# Patient Record
Sex: FEMALE | Race: BLACK OR AFRICAN AMERICAN | URBAN - METROPOLITAN AREA
[De-identification: names, ages, dates, MRNs, and addresses within clinical notes are randomized per-mention and may not be internally consistent; named-entity substitution may affect disease eponyms.]

---

## 2017-03-22 ENCOUNTER — OFFICE VISIT (OUTPATIENT)
Dept: FAMILY MEDICINE CLINIC | Age: 27
End: 2017-03-22

## 2017-03-22 VITALS
OXYGEN SATURATION: 100 % | BODY MASS INDEX: 22.7 KG/M2 | SYSTOLIC BLOOD PRESSURE: 118 MMHG | RESPIRATION RATE: 18 BRPM | HEART RATE: 61 BPM | HEIGHT: 67 IN | DIASTOLIC BLOOD PRESSURE: 70 MMHG | WEIGHT: 144.6 LBS | TEMPERATURE: 98.2 F

## 2017-03-22 DIAGNOSIS — Z00.00 PHYSICAL EXAM: Primary | ICD-10-CM

## 2017-03-22 NOTE — PROGRESS NOTES
Annamarie Menendez is a 23 y.o. female   Chief Complaint   Patient presents with    Complete Physical    pt states she stopped ocp was getting emotional on it and is doing much better. Pt is also going to be getting  in the summer and states she is tracking her periods instead. Pt also for CPE. Chief Complaint   Patient presents with    Complete Physical     she is a 23y.o. year old female who presents for evalution. Reviewed PmHx, RxHx, FmHx, SocHx, AllgHx and updated and dated in the chart.     Review of Systems - negative except as listed above in the HPI    Objective:     Vitals:    03/22/17 1036   BP: 118/70   Pulse: 61   Resp: 18   Temp: 98.2 °F (36.8 °C)   TempSrc: Oral   SpO2: 100%   Weight: 144 lb 9.6 oz (65.6 kg)   Height: 5' 7\" (1.702 m)     Physical Examination: General appearance - alert, well appearing, and in no distress  Mental status - alert, oriented to person, place, and time  Eyes - pupils equal and reactive, extraocular eye movements intact  Ears - bilateral TM's and external ear canals normal  Nose - normal and patent, no erythema, discharge or polyps  Mouth - mucous membranes moist, pharynx normal without lesions  Neck - supple, no significant adenopathy  Lymphatics - no palpable lymphadenopathy, no hepatosplenomegaly  Chest - clear to auscultation, no wheezes, rales or rhonchi, symmetric air entry  Heart - normal rate, regular rhythm, normal S1, S2, no murmurs, rubs, clicks or gallops  Abdomen - soft, nontender, nondistended, no masses or organomegaly  Back exam - full range of motion, no tenderness, palpable spasm or pain on motion  Neurological - alert, oriented, normal speech, no focal findings or movement disorder noted  Musculoskeletal - no joint tenderness, deformity or swelling  Extremities - peripheral pulses normal, no pedal edema, no clubbing or cyanosis  Skin - normal coloration and turgor, no rashes, no suspicious skin lesions noted    Assessment/ Plan: Lorenzo Meeks was seen today for complete physical.    Diagnoses and all orders for this visit:    Physical exam  -     CBC WITH AUTOMATED DIFF  -     METABOLIC PANEL, COMPREHENSIVE  -     TSH 3RD GENERATION  -     LIPID PANEL       -Patient is in good health  -Discussed with patient cancer risk factors and screens needed  -Patient needs a colonoscopy no  -Labs from previous visits were discussed with patient yes  -Discussed with patient diet and exercise=yes  -Discussed with patient testicular (male)/breast self exam (female)= yes  Follow-up Disposition:  Return in about 1 year (around 3/22/2018), or if symptoms worsen or fail to improve. I have discussed the diagnosis with the patient and the intended plan as seen in the above orders. The patient has received an after-visit summary and questions were answered concerning future plans. Pt conveyed understanding. Medication Side Effects and Warnings were discussed with patient: n/a  Patient Labs were reviewed and or requested: yes  Patient Past Records were reviewed and or requested  n/a    Patient Instructions   A Healthy Lifestyle: Care Instructions  Your Care Instructions  A healthy lifestyle can help you feel good, stay at a healthy weight, and have plenty of energy for both work and play. A healthy lifestyle is something you can share with your whole family. A healthy lifestyle also can lower your risk for serious health problems, such as high blood pressure, heart disease, and diabetes. You can follow a few steps listed below to improve your health and the health of your family. Follow-up care is a key part of your treatment and safety. Be sure to make and go to all appointments, and call your doctor if you are having problems. Its also a good idea to know your test results and keep a list of the medicines you take. How can you care for yourself at home? · Do not eat too much sugar, fat, or fast foods.  You can still have dessert and treats now and then. The goal is moderation. · Start small to improve your eating habits. Pay attention to portion sizes, drink less juice and soda pop, and eat more fruits and vegetables. ¨ Eat a healthy amount of food. A 3-ounce serving of meat, for example, is about the size of a deck of cards. Fill the rest of your plate with vegetables and whole grains. ¨ Limit the amount of soda and sports drinks you have every day. Drink more water when you are thirsty. ¨ Eat at least 5 servings of fruits and vegetables every day. It may seem like a lot, but it is not hard to reach this goal. A serving or helping is 1 piece of fruit, 1 cup of vegetables, or 2 cups of leafy, raw vegetables. Have an apple or some carrot sticks as an afternoon snack instead of a candy bar. Try to have fruits and/or vegetables at every meal.  · Make exercise part of your daily routine. You may want to start with simple activities, such as walking, bicycling, or slow swimming. Try to be active 30 to 60 minutes every day. You do not need to do all 30 to 60 minutes all at once. For example, you can exercise 3 times a day for 10 or 20 minutes. Moderate exercise is safe for most people, but it is always a good idea to talk to your doctor before starting an exercise program.  · Keep moving. [place] the lawn, work in the garden, or [place]. Take the stairs instead of the elevator at work. · If you smoke, quit. People who smoke have an increased risk for heart attack, stroke, cancer, and other lung illnesses. Quitting is hard, but there are ways to boost your chance of quitting tobacco for good. ¨ Use nicotine gum, patches, or lozenges. ¨ Ask your doctor about stop-smoking programs and medicines. ¨ Keep trying.   In addition to reducing your risk of diseases in the future, you will notice some benefits soon after you stop using tobacco. If you have shortness of breath or asthma symptoms, they will likely get better within a few weeks after you quit.  · Limit how much alcohol you drink. Moderate amounts of alcohol (up to 2 drinks a day for men, 1 drink a day for women) are okay. But drinking too much can lead to liver problems, high blood pressure, and other health problems. Family health  If you have a family, there are many things you can do together to improve your health. · Eat meals together as a family as often as possible. · Eat healthy foods. This includes fruits, vegetables, lean meats and dairy, and whole grains. · Include your family in your fitness plan. Most people think of activities such as jogging or tennis as the way to fitness, but there are many ways you and your family can be more active. Anything that makes you breathe hard and gets your heart pumping is exercise. Here are some tips:  ¨ Walk to do errands or to take your child to school or the bus. ¨ Go for a family bike ride after dinner instead of watching TV. Where can you learn more? Go to http://christin-satish.info/. Enter F923 in the search box to learn more about \"A Healthy Lifestyle: Care Instructions. \"  Current as of: July 26, 2016  Content Version: 11.1  © 8286-5662 [employer]. Care instructions adapted under license by [employer] (which disclaims liability or warranty for this information). If you have questions about a medical condition or this instruction, always ask your healthcare professional. [place] [address] any warranty or liability for your use of this information.             Dr. Audrey Christina

## 2017-03-22 NOTE — MR AVS SNAPSHOT
Visit Information Date & Time Provider Department Dept. Phone Encounter #  
 3/22/2017 10:30 AM Nan Hillman, [facility] [phone] [id number] Follow-up Instructions Return in about 1 year (around 3/22/2018), or if symptoms worsen or fail to improve. Upcoming Health Maintenance Date Due Pneumococcal 19-64 Medium Risk (1 of 1 - PPSV23) 11/25/2010 HPV AGE 9Y-26Y (3 of 3 - Female 3 Dose Series) 3/4/2012 PAP AKA CERVICAL CYTOLOGY 11/25/2012 DTaP/Tdap/Td series (2 - Td) 7/21/2020 Allergies as of 3/22/2017  Review Complete On: 3/22/2017 By: Nan Hillman, DO No Known Allergies Current Immunizations  Reviewed on 4/26/2016 Name Date Hepatitis A Vaccine 11/4/2011, 8/5/2011  5:03 PM  
 Human Papillomavirus 11/4/2011, 8/5/2011 MMR Vaccine 9/29/2011 Meningococcal Vaccine 7/21/2010 PPD 8/5/2011  5:01 PM  
 TDAP Vaccine 7/21/2010 Not reviewed this visit You Were Diagnosed With   
  
 Codes Comments Physical exam    -  Primary ICD-10-CM: Z00.00 ICD-9-CM: V70.9 Vitals BP Pulse Temp Resp Height(growth percentile) Weight(growth percentile) 118/70 61 98.2 °F (36.8 °C) (Oral) 18 5' 7\" (1.702 m) 144 lb 9.6 oz (65.6 kg) LMP SpO2 BMI OB Status Smoking Status 03/17/2017 100% 22.65 kg/m2 Having regular periods Never Smoker Vitals History BMI and BSA Data Body Mass Index Body Surface Area  
 22.65 kg/m 2 1.76 m 2 Preferred Pharmacy Pharmacy Name Phone [id number] [address] [phone] Your Updated Medication List  
  
   
This list is accurate as of: 3/22/17 11:07 AM.  Always use your most recent med list.  
  
  
  
  
 Biotin 2,500 mcg Cap Take  by mouth. COLACE 100 mg capsule Generic drug:  docusate sodium Take 100 mg by mouth two (2) times a day. ibuprofen 100 mg tablet Take 100 mg by mouth every six (6) hours as needed. loratadine 10 mg tablet Commonly known as:  Abby Nay Take 1 Tab by mouth daily for 360 days. prenatal multivit-ca-min-fe-fa Tab Take  by mouth. PROBIOTIC 4X 10-15 mg Tbec Generic drug:  B.infantis-B.ani-B.long-B.bifi Take  by mouth. VITAMIN B-1 100 mg tablet Generic drug:  thiamine Take  by mouth daily. VITAMIN D3 1,000 unit Cap Generic drug:  cholecalciferol Take  by mouth. We Performed the Following CBC WITH AUTOMATED DIFF [01735 CPT(R)] LIPID PANEL [61317 CPT(R)] METABOLIC PANEL, COMPREHENSIVE [48224 CPT(R)] TSH 3RD GENERATION [44904 CPT(R)] Follow-up Instructions Return in about 1 year (around 3/22/2018), or if symptoms worsen or fail to improve. Patient Instructions A Healthy Lifestyle: Care Instructions Your Care Instructions A healthy lifestyle can help you feel good, stay at a healthy weight, and have plenty of energy for both work and play. A healthy lifestyle is something you can share with your whole family. A healthy lifestyle also can lower your risk for serious health problems, such as high blood pressure, heart disease, and diabetes. You can follow a few steps listed below to improve your health and the health of your family. Follow-up care is a key part of your treatment and safety. Be sure to make and go to all appointments, and call your doctor if you are having problems. Its also a good idea to know your test results and keep a list of the medicines you take. How can you care for yourself at home? · Do not eat too much sugar, fat, or fast foods. You can still have dessert and treats now and then. The goal is moderation. · Start small to improve your eating habits. Pay attention to portion sizes, drink less juice and soda pop, and eat more fruits and vegetables. ¨ Eat a healthy amount of food.  A 3-ounce serving of meat, for example, is about the size of a deck of cards. Fill the rest of your plate with vegetables and whole grains. ¨ Limit the amount of soda and sports drinks you have every day. Drink more water when you are thirsty. ¨ Eat at least 5 servings of fruits and vegetables every day. It may seem like a lot, but it is not hard to reach this goal. A serving or helping is 1 piece of fruit, 1 cup of vegetables, or 2 cups of leafy, raw vegetables. Have an apple or some carrot sticks as an afternoon snack instead of a candy bar. Try to have fruits and/or vegetables at every meal. 
· Make exercise part of your daily routine. You may want to start with simple activities, such as walking, bicycling, or slow swimming. Try to be active 30 to 60 minutes every day. You do not need to do all 30 to 60 minutes all at once. For example, you can exercise 3 times a day for 10 or 20 minutes. Moderate exercise is safe for most people, but it is always a good idea to talk to your doctor before starting an exercise program. 
· Keep moving. [place] the lawn, work in the garden, or [employer]. Take the stairs instead of the elevator at work. · If you smoke, quit. People who smoke have an increased risk for heart attack, stroke, cancer, and other lung illnesses. Quitting is hard, but there are ways to boost your chance of quitting tobacco for good. ¨ Use nicotine gum, patches, or lozenges. ¨ Ask your doctor about stop-smoking programs and medicines. ¨ Keep trying. In addition to reducing your risk of diseases in the future, you will notice some benefits soon after you stop using tobacco. If you have shortness of breath or asthma symptoms, they will likely get better within a few weeks after you quit. · Limit how much alcohol you drink. Moderate amounts of alcohol (up to 2 drinks a day for men, 1 drink a day for women) are okay. But drinking too much can lead to liver problems, high blood pressure, and other health problems. Family health If you have a family, there are many things you can do together to improve your health. · Eat meals together as a family as often as possible. · Eat healthy foods. This includes fruits, vegetables, lean meats and dairy, and whole grains. · Include your family in your fitness plan. Most people think of activities such as jogging or tennis as the way to fitness, but there are many ways you and your family can be more active. Anything that makes you breathe hard and gets your heart pumping is exercise. Here are some tips: 
¨ Walk to do errands or to take your child to school or the bus. ¨ Go for a family bike ride after dinner instead of watching TV. Where can you learn more? Go to http://christin-satish.info/. Enter L552 in the search box to learn more about \"A Healthy Lifestyle: Care Instructions. \" Current as of: July 26, 2016 Content Version: 11.1 © 6682-4482 [employer]. Care instructions adapted under license by [employer] (which disclaims liability or warranty for this information). If you have questions about a medical condition or this instruction, always ask your healthcare professional. [place] [address] any warranty or liability for your use of this information. Introducing [facility]! Wiliam Villegas introduces ZeroFOX patient portal. Now you can access parts of your medical record, email your doctor's office, and request medication refills online. 1. In your internet browser, go to [email or web address]­[email or web address] 2. Click on the First Time User? Click Here link in the Sign In box. You will see the New Member Sign Up page. 3. Enter your ZeroFOX Access Code exactly as it appears below. You will not need to use this code after youve completed the sign-up process. If you do not sign up before the expiration date, you must request a new code. · ZeroFOX Access Code: [email or web address] Expires: 6/20/2017 10:19 AM 
 
 4. Enter the last four digits of your Social Security Number (xxxx) and Date of Birth (mm/dd/yyyy) as indicated and click Submit. You will be taken to the next sign-up page. 5. Create a [email or web address] ID. This will be your [email or web address] login ID and cannot be changed, so think of one that is secure and easy to remember. 6. Create a [email or web address].net password. You can change your password at any time. 7. Enter your Password Reset Question and Answer. This can be used at a later time if you forget your password. 8. Enter your e-mail address. You will receive e-mail notification when new information is available in [address] 9. Click Sign Up. You can now view and download portions of your medical record. 10. Click the Download Summary menu link to download a portable copy of your medical information. If you have questions, please visit the Frequently Asked Questions section of the [email or web address] website. Remember, [email or web address] is NOT to be used for urgent needs. For medical emergencies, dial 911. Now available from your iPhone and Android! Please provide this summary of care documentation to your next provider. Your primary care clinician is listed as Gayle Maharaj. If you have any questions after today's visit, please call [phone].

## 2017-03-22 NOTE — PATIENT INSTRUCTIONS

## 2017-03-22 NOTE — PROGRESS NOTES
Pt here for cpe, pt is fasting  States she is no longer taking birth control, would like rx canceled

## 2017-03-23 LAB
ALBUMIN SERPL-MCNC: 4.4 G/DL (ref 3.5–5.5)
ALBUMIN/GLOB SERPL: 1.5 {RATIO} (ref 1.2–2.2)
ALP SERPL-CCNC: 65 IU/L (ref 39–117)
ALT SERPL-CCNC: 14 IU/L (ref 0–32)
AST SERPL-CCNC: 18 IU/L (ref 0–40)
BASOPHILS # BLD AUTO: 0 X10E3/UL (ref 0–0.2)
BASOPHILS NFR BLD AUTO: 0 %
BILIRUB SERPL-MCNC: 0.4 MG/DL (ref 0–1.2)
BUN SERPL-MCNC: 10 MG/DL (ref 6–20)
BUN/CREAT SERPL: 14 (ref 8–20)
CALCIUM SERPL-MCNC: 9.4 MG/DL (ref 8.7–10.2)
CHLORIDE SERPL-SCNC: 99 MMOL/L (ref 96–106)
CHOLEST SERPL-MCNC: 195 MG/DL (ref 100–199)
CO2 SERPL-SCNC: 25 MMOL/L (ref 18–29)
CREAT SERPL-MCNC: 0.74 MG/DL (ref 0.57–1)
EOSINOPHIL # BLD AUTO: 0.1 X10E3/UL (ref 0–0.4)
EOSINOPHIL NFR BLD AUTO: 1 %
ERYTHROCYTE [DISTWIDTH] IN BLOOD BY AUTOMATED COUNT: 14 % (ref 12.3–15.4)
GLOBULIN SER CALC-MCNC: 3 G/DL (ref 1.5–4.5)
GLUCOSE SERPL-MCNC: 79 MG/DL (ref 65–99)
HCT VFR BLD AUTO: 37.3 % (ref 34–46.6)
HDLC SERPL-MCNC: 84 MG/DL
HGB BLD-MCNC: 12.4 G/DL (ref 11.1–15.9)
IMM GRANULOCYTES # BLD: 0 X10E3/UL (ref 0–0.1)
IMM GRANULOCYTES NFR BLD: 0 %
INTERPRETATION, 910389: NORMAL
LDLC SERPL CALC-MCNC: 104 MG/DL (ref 0–99)
LYMPHOCYTES # BLD AUTO: 2 X10E3/UL (ref 0.7–3.1)
LYMPHOCYTES NFR BLD AUTO: 29 %
MCH RBC QN AUTO: 31.2 PG (ref 26.6–33)
MCHC RBC AUTO-ENTMCNC: 33.2 G/DL (ref 31.5–35.7)
MCV RBC AUTO: 94 FL (ref 79–97)
MONOCYTES # BLD AUTO: 0.3 X10E3/UL (ref 0.1–0.9)
MONOCYTES NFR BLD AUTO: 5 %
NEUTROPHILS # BLD AUTO: 4.3 X10E3/UL (ref 1.4–7)
NEUTROPHILS NFR BLD AUTO: 65 %
PLATELET # BLD AUTO: 371 X10E3/UL (ref 150–379)
POTASSIUM SERPL-SCNC: 4.3 MMOL/L (ref 3.5–5.2)
PROT SERPL-MCNC: 7.4 G/DL (ref 6–8.5)
RBC # BLD AUTO: 3.98 X10E6/UL (ref 3.77–5.28)
SODIUM SERPL-SCNC: 138 MMOL/L (ref 134–144)
TRIGL SERPL-MCNC: 36 MG/DL (ref 0–149)
TSH SERPL DL<=0.005 MIU/L-ACNC: 0.96 UIU/ML (ref 0.45–4.5)
VLDLC SERPL CALC-MCNC: 7 MG/DL (ref 5–40)
WBC # BLD AUTO: 6.7 X10E3/UL (ref 3.4–10.8)

## 2017-08-30 ENCOUNTER — OFFICE VISIT (OUTPATIENT)
Dept: FAMILY MEDICINE CLINIC | Age: 27
End: 2017-08-30

## 2017-08-30 VITALS
BODY MASS INDEX: 23.86 KG/M2 | DIASTOLIC BLOOD PRESSURE: 73 MMHG | HEART RATE: 93 BPM | HEIGHT: 67 IN | RESPIRATION RATE: 16 BRPM | OXYGEN SATURATION: 99 % | TEMPERATURE: 98.7 F | WEIGHT: 152 LBS | SYSTOLIC BLOOD PRESSURE: 109 MMHG

## 2017-08-30 DIAGNOSIS — R05.9 COUGH: ICD-10-CM

## 2017-08-30 DIAGNOSIS — J01.00 ACUTE NON-RECURRENT MAXILLARY SINUSITIS: Primary | ICD-10-CM

## 2017-08-30 RX ORDER — PROMETHAZINE HYDROCHLORIDE AND CODEINE PHOSPHATE 6.25; 1 MG/5ML; MG/5ML
1 SOLUTION ORAL
Qty: 120 ML | Refills: 0 | Status: SHIPPED | OUTPATIENT
Start: 2017-08-30 | End: 2018-04-23

## 2017-08-30 RX ORDER — AMOXICILLIN 875 MG/1
875 TABLET, FILM COATED ORAL 2 TIMES DAILY
Qty: 14 TAB | Refills: 0 | Status: SHIPPED | OUTPATIENT
Start: 2017-08-30 | End: 2017-09-06

## 2017-08-30 RX ORDER — FLUCONAZOLE 150 MG/1
150 TABLET ORAL DAILY
Qty: 2 TAB | Refills: 0 | Status: SHIPPED | OUTPATIENT
Start: 2017-08-30 | End: 2017-08-31

## 2017-08-30 NOTE — PATIENT INSTRUCTIONS
Sinusitis: Care Instructions  Your Care Instructions    Sinusitis is an infection of the lining of the sinus cavities in your head. Sinusitis often follows a cold. It causes pain and pressure in your head and face. In most cases, sinusitis gets better on its own in 1 to 2 weeks. But some mild symptoms may last for several weeks. Sometimes antibiotics are needed. Follow-up care is a key part of your treatment and safety. Be sure to make and go to all appointments, and call your doctor if you are having problems. It's also a good idea to know your test results and keep a list of the medicines you take. How can you care for yourself at home? · Take an over-the-counter pain medicine, such as acetaminophen (Tylenol), ibuprofen (Advil, Motrin), or naproxen (Aleve). Read and follow all instructions on the label. · If the doctor prescribed antibiotics, take them as directed. Do not stop taking them just because you feel better. You need to take the full course of antibiotics. · Be careful when taking over-the-counter cold or flu medicines and Tylenol at the same time. Many of these medicines have acetaminophen, which is Tylenol. Read the labels to make sure that you are not taking more than the recommended dose. Too much acetaminophen (Tylenol) can be harmful. · Breathe warm, moist air from a steamy shower, a hot bath, or a sink filled with hot water. Avoid cold, dry air. Using a humidifier in your home may help. Follow the directions for cleaning the machine. · Use saline (saltwater) nasal washes to help keep your nasal passages open and wash out mucus and bacteria. You can buy saline nose drops at a grocery store or drugstore. Or you can make your own at home by adding 1 teaspoon of salt and 1 teaspoon of baking soda to 2 cups of distilled water. If you make your own, fill a bulb syringe with the solution, insert the tip into your nostril, and squeeze gently. [place] your nose.   · Put a hot, wet towel or a warm gel pack on your face 3 or 4 times a day for 5 to 10 minutes each time. · Try a decongestant nasal spray like oxymetazoline (Afrin). Do not use it for more than 3 days in a row. Using it for more than 3 days can make your congestion worse. When should you call for help? Call your doctor now or seek immediate medical care if:  · You have new or worse swelling or redness in your face or around your eyes. · You have a new or higher fever. Watch closely for changes in your health, and be sure to contact your doctor if:  · You have new or worse facial pain. · The mucus from your nose becomes thicker (like pus) or has new blood in it. · You are not getting better as expected. Where can you learn more? Go to http://christin-satish.info/. Enter J110 in the search box to learn more about \"Sinusitis: Care Instructions. \"  Current as of: July 29, 2016  Content Version: 11.3  © 3720-2347 [employer]. Care instructions adapted under license by [employer] (which disclaims liability or warranty for this information). If you have questions about a medical condition or this instruction, always ask your healthcare professional. [place] [address] any warranty or liability for your use of this information.

## 2017-08-30 NOTE — LETTER
8/30/2017 7:47 AM 
 
Ms. Homa Hartmann [facility] [address] [address] Please excuse Ms. Mason from work 8/29/17 - 8/31/17 due to illness. Sincerely, Daylin Rainey NP

## 2017-08-30 NOTE — MR AVS SNAPSHOT
Visit Information Date & Time Provider Department Dept. Phone Encounter #  
 8/30/2017  7:30 AM Leena Gilliland NP [address] [facility] [phone] [id number] Follow-up Instructions Return if symptoms worsen or fail to improve. Upcoming Health Maintenance Date Due Pneumococcal 19-64 Medium Risk (1 of 1 - PPSV23) 11/25/2010 HPV AGE 9Y-26Y (3 of 3 - Female 3 Dose Series) 3/4/2012 PAP AKA CERVICAL CYTOLOGY 11/25/2012 INFLUENZA AGE 9 TO ADULT 8/1/2017 DTaP/Tdap/Td series (2 - Td) 7/21/2020 Allergies as of 8/30/2017  Review Complete On: 8/30/2017 By: Leena Gilliland NP No Known Allergies Current Immunizations  Reviewed on 4/26/2016 Name Date Hepatitis A Vaccine 11/4/2011, 8/5/2011  5:03 PM  
 Human Papillomavirus 11/4/2011, 8/5/2011 MMR Vaccine 9/29/2011 Meningococcal Vaccine 7/21/2010 PPD 8/5/2011  5:01 PM  
 TDAP Vaccine 7/21/2010 Not reviewed this visit You Were Diagnosed With   
  
 Codes Comments Acute non-recurrent maxillary sinusitis    -  Primary ICD-10-CM: J01.00 ICD-9-CM: 461.0 Cough     ICD-10-CM: R05 ICD-9-CM: 632. 2 Vitals BP Pulse Temp Resp Height(growth percentile) Weight(growth percentile) 109/73 93 98.7 °F (37.1 °C) (Oral) 16 5' 7\" (1.702 m) 152 lb (68.9 kg) LMP SpO2 BMI OB Status Smoking Status 08/21/2017 (Exact Date) 99% 23.81 kg/m2 Having regular periods Never Smoker BMI and BSA Data Body Mass Index Body Surface Area  
 23.81 kg/m 2 1.8 m 2 Preferred Pharmacy Pharmacy Name Phone [facility]/PHARMACY #[id number] [phone] Your Updated Medication List  
  
   
This list is accurate as of: 8/30/17  7:48 AM.  Always use your most recent med list.  
  
  
  
  
 amoxicillin 875 mg tablet Commonly known as:  AMOXIL Take 1 Tab by mouth two (2) times a day for 7 days. Biotin 2,500 mcg Cap Take  by mouth. COLACE 100 mg capsule Generic drug:  docusate sodium Take 100 mg by mouth two (2) times a day. fluconazole 150 mg tablet Commonly known as:  DIFLUCAN Take 1 Tab by mouth daily for 1 day. May repeat in 3 days if needed  
  
 ibuprofen 100 mg tablet Take 100 mg by mouth every six (6) hours as needed. prenatal multivit-ca-min-fe-fa Tab Take  by mouth. PROBIOTIC 4X 10-15 mg Tbec Generic drug:  B.infantis-B.ani-B.long-B.bifi Take  by mouth.  
  
 promethazine-codeine 6.25-10 mg/5 mL syrup Commonly known as:  PHENERGAN with CODEINE Take 5 mL by mouth every six (6) hours as needed for Cough. Max Daily Amount: 20 mL. VITAMIN B-1 100 mg tablet Generic drug:  thiamine Take  by mouth daily. VITAMIN D3 1,000 unit Cap Generic drug:  cholecalciferol Take  by mouth. Prescriptions Printed Refills  
 promethazine-codeine (PHENERGAN WITH CODEINE) 6.25-10 mg/5 mL syrup 0 Sig: Take 5 mL by mouth every six (6) hours as needed for Cough. Max Daily Amount: 20 mL. Class: Print Route: Oral  
  
Prescriptions Sent to Pharmacy Refills  
 amoxicillin (AMOXIL) 875 mg tablet 0 Sig: Take 1 Tab by mouth two (2) times a day for 7 days. Class: Normal  
 Pharmacy: [facility]/pharmacy #[id number] Ph #: [phone] Route: Oral  
 fluconazole (DIFLUCAN) 150 mg tablet 0 Sig: Take 1 Tab by mouth daily for 1 day. May repeat in 3 days if needed Class: Normal  
 Pharmacy: [facility]/pharmacy #[id number]- [address] Ph #: [phone] Route: Oral  
  
Follow-up Instructions Return if symptoms worsen or fail to improve. Patient Instructions Sinusitis: Care Instructions Your Care Instructions Sinusitis is an infection of the lining of the sinus cavities in your head. Sinusitis often follows a cold. It causes pain and pressure in your head and face. In most cases, sinusitis gets better on its own in 1 to 2 weeks. But some mild symptoms may last for several weeks. Sometimes antibiotics are needed. Follow-up care is a key part of your treatment and safety. Be sure to make and go to all appointments, and call your doctor if you are having problems. It's also a good idea to know your test results and keep a list of the medicines you take. How can you care for yourself at home? · Take an over-the-counter pain medicine, such as acetaminophen (Tylenol), ibuprofen (Advil, Motrin), or naproxen (Aleve). Read and follow all instructions on the label. · If the doctor prescribed antibiotics, take them as directed. Do not stop taking them just because you feel better. You need to take the full course of antibiotics. · Be careful when taking over-the-counter cold or flu medicines and Tylenol at the same time. Many of these medicines have acetaminophen, which is Tylenol. Read the labels to make sure that you are not taking more than the recommended dose. Too much acetaminophen (Tylenol) can be harmful. · Breathe warm, moist air from a steamy shower, a hot bath, or a sink filled with hot water. Avoid cold, dry air. Using a humidifier in your home may help. Follow the directions for cleaning the machine. · Use saline (saltwater) nasal washes to help keep your nasal passages open and wash out mucus and bacteria. You can buy saline nose drops at a grocery store or drugstore. Or you can make your own at home by adding 1 teaspoon of salt and 1 teaspoon of baking soda to 2 cups of distilled water. If you make your own, fill a bulb syringe with the solution, insert the tip into your nostril, and squeeze gently. Willie Grammes your nose. · Put a hot, wet towel or a warm gel pack on your face 3 or 4 times a day for 5 to 10 minutes each time. · Try a decongestant nasal spray like oxymetazoline (Afrin).  Do not use it for more than 3 days in a row. Using it for more than 3 days can make your congestion worse. When should you call for help? Call your doctor now or seek immediate medical care if: 
· You have new or worse swelling or redness in your face or around your eyes. · You have a new or higher fever. Watch closely for changes in your health, and be sure to contact your doctor if: 
· You have new or worse facial pain. · The mucus from your nose becomes thicker (like pus) or has new blood in it. · You are not getting better as expected. Where can you learn more? Go to http://christin-satish.info/. Enter T332 in the search box to learn more about \"Sinusitis: Care Instructions. \" Current as of: July 29, 2016 Content Version: 11.3 © 0113-6478 Virtual [employer]. Care instructions adapted under license by [employer] (which disclaims liability or warranty for this information). If you have questions about a medical condition or this instruction, always ask your healthcare professional. [place] [address] any warranty or liability for your use of this information. Introducing [facility]! [facility] introduces [employer] patient portal. Now you can access parts of your medical record, email your doctor's office, and request medication refills online. 1. In your internet browser, go to [email or web address] 2. Click on the First Time User? Click Here link in the Sign In box. You will see the New Member Sign Up page. 3. Enter your [employer] Access Code exactly as it appears below. You will not need to use this code after youve completed the sign-up process. If you do not sign up before the expiration date, you must request a new code. · [employer] Access Code: [email or web address] Expires: 11/28/2017  7:46 AM 
 
4.  Enter the last four digits of your Social Security Number (xxxx) and Date of Birth (mm/dd/yyyy) as indicated and click Submit. You will be taken to the next sign-up page. 5. Create a Mass Mosaic ID. This will be your Mass Mosaic login ID and cannot be changed, so think of one that is secure and easy to remember. 6. Create a Mass Mosaic password. You can change your password at any time. 7. Enter your Password Reset Question and Answer. This can be used at a later time if you forget your password. 8. Enter your e-mail address. You will receive e-mail notification when new information is available in [address] 9. Click Sign Up. You can now view and download portions of your medical record. 10. Click the Download Summary menu link to download a portable copy of your medical information. If you have questions, please visit the Frequently Asked Questions section of the Mass M[employer] website. Remember, Mass Mosaic is NOT to be used for urgent needs. For medical emergencies, dial 911. Now available from your iPhone and Android! Please provide this summary of care documentation to your next provider. Your primary care clinician is listed as Jon Donato. If you have any questions after today's visit, please call [phone].

## 2017-08-30 NOTE — PROGRESS NOTES
Chief Complaint   Patient presents with    Cold Symptoms     Chills, running nose, cough, sore throat, x3 days     she is a 23y.o. year old female who presents for evalution. Pt was traveling with friend who was sick. Has been having sore throat, nausea, sinus congestion and pressure. Has been having chills, did not check temperature. Also having severe R ear pain. Lots of body aches. Has been drinking tea and lemon, no other OTCs. Reviewed PmHx, RxHx, FmHx, SocHx, AllgHx and updated and dated in the chart. Review of Systems - negative except as listed above in the HPI    Objective:     Vitals:    08/30/17 0732   BP: 109/73   Pulse: 93   Resp: 16   Temp: 98.7 °F (37.1 °C)   TempSrc: Oral   SpO2: 99%   Weight: 152 lb (68.9 kg)   Height: 5' 7\" (1.702 m)     Physical Examination: General appearance - alert, well appearing, and in no distress  Ears - bilateral TM's and external ear canals normal  Nose - normal nontender sinuses, mucosal congestion and mucosal erythema  Mouth - mucous membranes moist, pharynx normal without lesions and erythematous  Neck - supple, no significant adenopathy  Chest - clear to auscultation, no wheezes, rales or rhonchi, symmetric air entry  Heart - normal rate, regular rhythm, normal S1, S2, no murmurs, rubs, clicks or gallops    Assessment/ Plan:   Diagnoses and all orders for this visit:    1. Acute non-recurrent maxillary sinusitis  -     amoxicillin (AMOXIL) 875 mg tablet; Take 1 Tab by mouth two (2) times a day for 7 days. -     fluconazole (DIFLUCAN) 150 mg tablet; Take 1 Tab by mouth daily for 1 day. May repeat in 3 days if needed  New rx. Discussed and encouraged rest and clear fluids, if congestion is thick should avoid dairy. Recommended hot showers with steam and elevating head of bed. May use Vitamin C or Echinacea in addition to current therapy. 2. Cough  -     promethazine-codeine (PHENERGAN WITH CODEINE) 6.25-10 mg/5 mL syrup;  Take 5 mL by mouth every six (6) hours as needed for Cough. Max Daily Amount: 20 mL. New rx. Pt voiced understanding regarding plan of care. Follow-up Disposition:  Return if symptoms worsen or fail to improve. I have discussed the diagnosis with the patient and the intended plan as seen in the above orders. The patient has received an after-visit summary and questions were answered concerning future plans.      Medication Side Effects and Warnings were discussed with patient    Melany Sauceda NP

## 2017-08-30 NOTE — PROGRESS NOTES
1. Have you been to the ER, urgent care clinic since your last visit? Hospitalized since your last visit? No    2. Have you seen or consulted any other health care providers outside of the [facility] since your last visit? Include any pap smears or colon screening.  No     Chief Complaint   Patient presents with    Cold Symptoms     Chills, running nose, cough, sore throat, x3 days

## 2018-03-22 ENCOUNTER — OFFICE VISIT (OUTPATIENT)
Dept: FAMILY MEDICINE CLINIC | Age: 28
End: 2018-03-22

## 2018-03-22 VITALS
RESPIRATION RATE: 18 BRPM | HEIGHT: 67 IN | OXYGEN SATURATION: 100 % | SYSTOLIC BLOOD PRESSURE: 108 MMHG | DIASTOLIC BLOOD PRESSURE: 78 MMHG | WEIGHT: 160 LBS | BODY MASS INDEX: 25.11 KG/M2 | HEART RATE: 79 BPM | TEMPERATURE: 98.5 F

## 2018-03-22 DIAGNOSIS — Z00.00 PHYSICAL EXAM: Primary | ICD-10-CM

## 2018-03-22 NOTE — PATIENT INSTRUCTIONS
Breast Self-Exam: Care Instructions  Your Care Instructions    A breast self-exam is when you check your breasts for lumps or changes. This regular exam helps you learn how your breasts normally look and feel. Most breast problems or changes are not because of cancer. Breast self-exam is not a substitute for a mammogram. Having regular breast exams by your doctor and regular mammograms improve your chances of finding any problems with your breasts. Some women set a time each month to do a step-by-step breast self-exam. Other women like a less formal system. They might look at their breasts as they brush their teeth, or feel their breasts once in a while in the shower. If you notice a change in your breast, tell your doctor. Follow-up care is a key part of your treatment and safety. Be sure to make and go to all appointments, and call your doctor if you are having problems. It's also a good idea to know your test results and keep a list of the medicines you take. How do you do a breast self-exam?  · The best time to examine your breasts is usually one week after your menstrual period begins. Your breasts should not be tender then. If you do not have periods, you might do your exam on a day of the month that is easy to remember. · To examine your breasts:  ¨ Remove all your clothes above the waist and lie down. When you are lying down, your breast tissue spreads evenly over your chest wall, which makes it easier to feel all your breast tissue. ¨ Use the pads-not the fingertips-of the 3 middle fingers of your left hand to check your right breast. Move your fingers slowly in small coin-sized circles that overlap. ¨ Use three levels of pressure to feel of all your breast tissue. Use light pressure to feel the tissue close to the skin surface. Use medium pressure to feel a little deeper. Use firm pressure to feel your tissue close to your breastbone and ribs.  Use each pressure level to feel your breast tissue before moving on to the next spot. ¨ Check your entire breast, moving up and down as if following a strip from the collarbone to the bra line, and from the armpit to the ribs. Repeat until you have covered the entire breast.  ¨ Repeat this procedure for your left breast, using the pads of the 3 middle fingers of your right hand. · To examine your breasts while in the shower:  ¨ Place one arm over your head and lightly soap your breast on that side. ¨ Using the pads of your fingers, gently move your hand over your breast (in the strip pattern described above), feeling carefully for any lumps or changes. ¨ Repeat for the other breast.  · Have your doctor inspect anything you notice to see if you need further testing. Where can you learn more? Go to http://christin-satish.info/. Enter P148 in the search box to learn more about \"Breast Self-Exam: Care Instructions. \"  Current as of: May 12, 2017  Content Version: 11.4  © 3798-5615 Healthwise, Incorporated. Care instructions adapted under license by [employer] (which disclaims liability or warranty for this information). If you have questions about a medical condition or this instruction, always ask your healthcare professional. [place] [address] any warranty or liability for your use of this information.

## 2018-03-22 NOTE — MR AVS SNAPSHOT
[address] [place] [address] 
[phone] Patient: Abbey Estrada MRN:  [id number] Visit Information Date & Time Provider Department Dept. Phone Encounter #  
 3/22/2018  1:30 PM [address] [facility], [address] [phone] [id number] Follow-up Instructions Return if symptoms worsen or fail to improve. Upcoming Health Maintenance Date Due Pneumococcal 19-64 Medium Risk (1 of 1 - PPSV23) 2010 HPV AGE 9Y-26Y (3 of 3 - Female 3 Dose Series) 3/4/2012 PAP AKA CERVICAL CYTOLOGY 2012 Influenza Age 5 to Adult 2017 DTaP/Tdap/Td series (2 - Td) 2020 Allergies as of 3/22/2018  Review Complete On: 3/22/2018 By: [address] [facility], DO No Known Allergies Current Immunizations  Reviewed on 2016 Name Date Hepatitis A Vaccine 2011, 2011  5:03 PM  
 Human Papillomavirus 2011, 2011 MMR Vaccine 2011 Meningococcal Vaccine 2010 PPD 2011  5:01 PM  
 TDAP Vaccine 2010 Not reviewed this visit You Were Diagnosed With   
  
 Codes Comments Physical exam    -  Primary ICD-10-CM: Z00.00 ICD-9-CM: V70.9 Vitals BP Pulse Temp Resp Height(growth percentile) Weight(growth percentile) 108/78 79 98.5 °F (36.9 °C) (Oral) 18 5' 7\" (1.702 m) 160 lb (72.6 kg) LMP SpO2 BMI OB Status Smoking Status 2018 100% 25.06 kg/m2 Having regular periods Never Smoker Vitals History BMI and BSA Data Body Mass Index Body Surface Area 25.06 kg/m 2 1.85 m 2 Preferred Pharmacy Pharmacy Name Phone [id number] [place], [address] [facility] [phone] Your Updated Medication List  
  
   
This list is accurate as of 3/22/18  2:08 PM.  Always use your most recent med list.  
  
  
  
  
 Biotin 2,500 mcg Cap Take  by mouth. COLACE 100 mg capsule Generic drug:  docusate sodium Take 100 mg by mouth two (2) times a day. ibuprofen 100 mg tablet Take 100 mg by mouth every six (6) hours as needed. prenatal multivit-ca-min-fe-fa Tab Take  by mouth. PROBIOTIC 4X 10-15 mg Tbec Generic drug:  B.infantis-B.ani-B.long-B.bifi Take  by mouth.  
  
 promethazine-codeine 6.25-10 mg/5 mL syrup Commonly known as:  PHENERGAN with CODEINE Take 5 mL by mouth every six (6) hours as needed for Cough. Max Daily Amount: 20 mL. VITAMIN B-1 100 mg tablet Generic drug:  thiamine Take  by mouth daily. VITAMIN D3 1,000 unit Cap Generic drug:  cholecalciferol Take  by mouth. We Performed the Following CBC WITH AUTOMATED DIFF [83618 CPT(R)] LIPID PANEL [84609 CPT(R)] METABOLIC PANEL, COMPREHENSIVE [10807 CPT(R)] TSH 3RD GENERATION [84065 CPT(R)] URINALYSIS W/ RFLX MICROSCOPIC [73245 CPT(R)] Follow-up Instructions Return if symptoms worsen or fail to improve. Patient Instructions Breast Self-Exam: Care Instructions Your Care Instructions A breast self-exam is when you check your breasts for lumps or changes. This regular exam helps you learn how your breasts normally look and feel. Most breast problems or changes are not because of cancer. Breast self-exam is not a substitute for a mammogram. Having regular breast exams by your doctor and regular mammograms improve your chances of finding any problems with your breasts. Some women set a time each month to do a step-by-step breast self-exam. Other women like a less formal system. They might look at their breasts as they brush their teeth, or feel their breasts once in a while in the shower. If you notice a change in your breast, tell your doctor. Follow-up care is a key part of your treatment and safety.  Be sure to make and go to all appointments, and call your doctor if you are having problems. It's also a good idea to know your test results and keep a list of the medicines you take. How do you do a breast self-exam? 
· The best time to examine your breasts is usually one week after your menstrual period begins. Your breasts should not be tender then. If you do not have periods, you might do your exam on a day of the month that is easy to remember. · To examine your breasts: ¨ Remove all your clothes above the waist and lie down. When you are lying down, your breast tissue spreads evenly over your chest wall, which makes it easier to feel all your breast tissue. ¨ Use the pads-not the fingertips-of the 3 middle fingers of your left hand to check your right breast. Move your fingers slowly in small coin-sized circles that overlap. ¨ Use three levels of pressure to feel of all your breast tissue. Use light pressure to feel the tissue close to the skin surface. Use medium pressure to feel a little deeper. Use firm pressure to feel your tissue close to your breastbone and ribs. Use each pressure level to feel your breast tissue before moving on to the next spot. ¨ Check your entire breast, moving up and down as if following a strip from the collarbone to the bra line, and from the armpit to the ribs. Repeat until you have covered the entire breast. 
¨ Repeat this procedure for your left breast, using the pads of the 3 middle fingers of your right hand. · To examine your breasts while in the shower: 
¨ Place one arm over your head and lightly soap your breast on that side. ¨ Using the pads of your fingers, gently move your hand over your breast (in the strip pattern described above), feeling carefully for any lumps or changes. ¨ Repeat for the other breast. 
· Have your doctor inspect anything you notice to see if you need further testing. Where can you learn more? Go to http://christin-satish.info/.  
Enter P148 in the search box to learn more about \"Breast Self-Exam: Care Instructions. \" Current as of: May 12, 2017 Content Version: 11.4 © 5282-9140 Glad [employer] Have You. Care instructions adapted under license by [employer] (which disclaims liability or warranty for this information). If you have questions about a medical condition or this instruction, always ask your healthcare professional. [address] any warranty or liability for your use of this information. Introducing [facility]! Jose Torre introduces [employer] patient portal. Now you can access parts of your medical record, email your doctor's office, and request medication refills online. 1. In your internet browser, go to [email or web address] 2. Click on the First Time User? Click Here link in the Sign In box. You will see the New Member Sign Up page. 3. Enter your [employer] Access Code exactly as it appears below. You will not need to use this code after youve completed the sign-up process. If you do not sign up before the expiration date, you must request a new code. · [employer] Access Code: [email or web address] Expires: 6/20/2018  1:33 PM 
 
4. Enter the last four digits of your Social Security Number (xxxx) and Date of Birth (mm/dd/yyyy) as indicated and click Submit. You will be taken to the next sign-up page. 5. Create a [employer] ID. This will be your [employer] login ID and cannot be changed, so think of one that is secure and easy to remember. 6. Create a [employer] password. You can change your password at any time. 7. Enter your Password Reset Question and Answer. This can be used at a later time if you forget your password. 8. Enter your e-mail address. You will receive e-mail notification when new information is available in [address] 9. Click Sign Up. You can now view and download portions of your medical record. 10. Click the Download Summary menu link to download a portable copy of your medical information. If you have questions, please visit the Frequently Asked Questions section of the [employer] website. Remember, [employer] is NOT to be used for urgent needs. For medical emergencies, dial 911. Now available from your iPhone and Android! Please provide this summary of care documentation to your next provider. Your primary care clinician is listed as Maddie Abbott. If you have any questions after today's visit, please call [phone].

## 2018-03-22 NOTE — PROGRESS NOTES
Vitaliy Manzano is a 33 y.o. female   Chief Complaint   Patient presents with    Complete Physical    pt here for CPE and states she has a little virus right now. Pt is trying to get pregnant, had a miscarriage in December. Chief Complaint   Patient presents with    Complete Physical     she is a 33y.o. year old female who presents for evalution. Reviewed PmHx, RxHx, FmHx, SocHx, AllgHx and updated and dated in the chart. Review of Systems - negative except as listed above in the HPI    Objective:     Vitals:    03/22/18 1344   BP: 108/78   Pulse: 79   Resp: 18   Temp: 98.5 °F (36.9 °C)   TempSrc: Oral   SpO2: 100%   Weight: 160 lb (72.6 kg)   Height: 5' 7\" (1.702 m)     Physical Examination: General appearance - alert, well appearing, and in no distress  Mental status - alert, oriented to person, place, and time  Eyes - pupils equal and reactive, extraocular eye movements intact  Ears - bilateral TM's and external ear canals normal  Nose - normal and patent, no erythema, discharge or polyps  Mouth - mucous membranes moist, pharynx normal without lesions  Neck - supple, no significant adenopathy  Lymphatics - no palpable lymphadenopathy, no hepatosplenomegaly  Chest - clear to auscultation, no wheezes, rales or rhonchi, symmetric air entry  Heart - normal rate, regular rhythm, normal S1, S2, no murmurs, rubs, clicks or gallops  Abdomen - soft, nontender, nondistended, no masses or organomegaly  Back exam - full range of motion, no tenderness, palpable spasm or pain on motion  Neurological - alert, oriented, normal speech, no focal findings or movement disorder noted  Musculoskeletal - no joint tenderness, deformity or swelling  Extremities - peripheral pulses normal, no pedal edema, no clubbing or cyanosis    Assessment/ Plan:   Diagnoses and all orders for this visit:    1.  Physical exam  -     CBC WITH AUTOMATED DIFF  -     METABOLIC PANEL, COMPREHENSIVE  -     TSH 3RD GENERATION  - LIPID PANEL  -     URINALYSIS W/ RFLX MICROSCOPIC       -Patient is in good health  -Discussed with patient cancer risk factors and screens needed  -Patient needs a colonoscopy no  -Labs from previous visits were discussed with patient yes  -Discussed with patient diet and exercise=yes  -Discussed with patient testicular (male)/breast self exam (female)= yes  Follow-up Disposition:  Return if symptoms worsen or fail to improve. I have discussed the diagnosis with the patient and the intended plan as seen in the above orders. The patient has received an after-visit summary and questions were answered concerning future plans. Pt conveyed understanding. Medication Side Effects and Warnings were discussed with patient: n/a  Patient Labs were reviewed and or requested: yes  Patient Past Records were reviewed and or requested  yes    Patient Instructions   Breast Self-Exam: Care Instructions  Your Care Instructions    A breast self-exam is when you check your breasts for lumps or changes. This regular exam helps you learn how your breasts normally look and feel. Most breast problems or changes are not because of cancer. Breast self-exam is not a substitute for a mammogram. Having regular breast exams by your doctor and regular mammograms improve your chances of finding any problems with your breasts. Some women set a time each month to do a step-by-step breast self-exam. Other women like a less formal system. They might look at their breasts as they brush their teeth, or feel their breasts once in a while in the shower. If you notice a change in your breast, tell your doctor. Follow-up care is a key part of your treatment and safety. Be sure to make and go to all appointments, and call your doctor if you are having problems. It's also a good idea to know your test results and keep a list of the medicines you take.   How do you do a breast self-exam?  · The best time to examine your breasts is usually one week after your menstrual period begins. Your breasts should not be tender then. If you do not have periods, you might do your exam on a day of the month that is easy to remember. · To examine your breasts:  ¨ Remove all your clothes above the waist and lie down. When you are lying down, your breast tissue spreads evenly over your chest wall, which makes it easier to feel all your breast tissue. ¨ Use the pads-not the fingertips-of the 3 middle fingers of your left hand to check your right breast. Move your fingers slowly in small coin-sized circles that overlap. ¨ Use three levels of pressure to feel of all your breast tissue. Use light pressure to feel the tissue close to the skin surface. Use medium pressure to feel a little deeper. Use firm pressure to feel your tissue close to your breastbone and ribs. Use each pressure level to feel your breast tissue before moving on to the next spot. ¨ Check your entire breast, moving up and down as if following a strip from the collarbone to the bra line, and from the armpit to the ribs. Repeat until you have covered the entire breast.  ¨ Repeat this procedure for your left breast, using the pads of the 3 middle fingers of your right hand. · To examine your breasts while in the shower:  ¨ Place one arm over your head and lightly soap your breast on that side. ¨ Using the pads of your fingers, gently move your hand over your breast (in the strip pattern described above), feeling carefully for any lumps or changes. ¨ Repeat for the other breast.  · Have your doctor inspect anything you notice to see if you need further testing. Where can you learn more? Go to http://christin-satish.info/. Enter P148 in the search box to learn more about \"Breast Self-Exam: Care Instructions. \"  Current as of: May 12, 2017  Content Version: 11.4  © 5800-6555 Healthwise, [employer].  Care instructions adapted under license by [employer] (which disclaims liability or warranty for this information). If you have questions about a medical condition or this instruction, always ask your healthcare professional. [place] [address] any warranty or liability for your use of this information.     Dr. Estephanie Sosa

## 2018-03-23 LAB
ALBUMIN SERPL-MCNC: 4.8 G/DL (ref 3.5–5.5)
ALBUMIN/GLOB SERPL: 1.6 {RATIO} (ref 1.2–2.2)
ALP SERPL-CCNC: 80 IU/L (ref 39–117)
ALT SERPL-CCNC: 18 IU/L (ref 0–32)
APPEARANCE UR: CLEAR
AST SERPL-CCNC: 28 IU/L (ref 0–40)
BACTERIA #/AREA URNS HPF: NORMAL /[HPF]
BASOPHILS # BLD AUTO: 0 X10E3/UL (ref 0–0.2)
BASOPHILS NFR BLD AUTO: 0 %
BILIRUB SERPL-MCNC: 0.5 MG/DL (ref 0–1.2)
BILIRUB UR QL STRIP: NEGATIVE
BUN SERPL-MCNC: 9 MG/DL (ref 6–20)
BUN/CREAT SERPL: 13 (ref 9–23)
CALCIUM SERPL-MCNC: 9.5 MG/DL (ref 8.7–10.2)
CASTS URNS QL MICRO: NORMAL /LPF
CHLORIDE SERPL-SCNC: 98 MMOL/L (ref 96–106)
CHOLEST SERPL-MCNC: 196 MG/DL (ref 100–199)
CO2 SERPL-SCNC: 26 MMOL/L (ref 18–29)
COLOR UR: YELLOW
CREAT SERPL-MCNC: 0.67 MG/DL (ref 0.57–1)
EOSINOPHIL # BLD AUTO: 0.1 X10E3/UL (ref 0–0.4)
EOSINOPHIL NFR BLD AUTO: 1 %
EPI CELLS #/AREA URNS HPF: NORMAL /HPF
ERYTHROCYTE [DISTWIDTH] IN BLOOD BY AUTOMATED COUNT: 13.7 % (ref 12.3–15.4)
GFR SERPLBLD CREATININE-BSD FMLA CKD-EPI: 122 ML/MIN/1.73
GFR SERPLBLD CREATININE-BSD FMLA CKD-EPI: 140 ML/MIN/1.73
GLOBULIN SER CALC-MCNC: 3 G/DL (ref 1.5–4.5)
GLUCOSE SERPL-MCNC: 75 MG/DL (ref 65–99)
GLUCOSE UR QL: NEGATIVE
HCT VFR BLD AUTO: 35.4 % (ref 34–46.6)
HDLC SERPL-MCNC: 83 MG/DL
HGB BLD-MCNC: 11.9 G/DL (ref 11.1–15.9)
HGB UR QL STRIP: ABNORMAL
IMM GRANULOCYTES # BLD: 0 X10E3/UL (ref 0–0.1)
IMM GRANULOCYTES NFR BLD: 0 %
INTERPRETATION, 910389: NORMAL
KETONES UR QL STRIP: NEGATIVE
LDLC SERPL CALC-MCNC: 104 MG/DL (ref 0–99)
LEUKOCYTE ESTERASE UR QL STRIP: NEGATIVE
LYMPHOCYTES # BLD AUTO: 2.3 X10E3/UL (ref 0.7–3.1)
LYMPHOCYTES NFR BLD AUTO: 25 %
MCH RBC QN AUTO: 30.6 PG (ref 26.6–33)
MCHC RBC AUTO-ENTMCNC: 33.6 G/DL (ref 31.5–35.7)
MCV RBC AUTO: 91 FL (ref 79–97)
MICRO URNS: ABNORMAL
MONOCYTES # BLD AUTO: 0.5 X10E3/UL (ref 0.1–0.9)
MONOCYTES NFR BLD AUTO: 5 %
MUCOUS THREADS URNS QL MICRO: PRESENT
NEUTROPHILS # BLD AUTO: 6.3 X10E3/UL (ref 1.4–7)
NEUTROPHILS NFR BLD AUTO: 69 %
NITRITE UR QL STRIP: NEGATIVE
PH UR STRIP: 6.5 [PH] (ref 5–7.5)
PLATELET # BLD AUTO: 361 X10E3/UL (ref 150–379)
POTASSIUM SERPL-SCNC: 4 MMOL/L (ref 3.5–5.2)
PROT SERPL-MCNC: 7.8 G/DL (ref 6–8.5)
PROT UR QL STRIP: NEGATIVE
RBC # BLD AUTO: 3.89 X10E6/UL (ref 3.77–5.28)
RBC #/AREA URNS HPF: NORMAL /HPF
SODIUM SERPL-SCNC: 139 MMOL/L (ref 134–144)
SP GR UR: 1.01 (ref 1–1.03)
TRIGL SERPL-MCNC: 44 MG/DL (ref 0–149)
TSH SERPL DL<=0.005 MIU/L-ACNC: 0.9 UIU/ML (ref 0.45–4.5)
UROBILINOGEN UR STRIP-MCNC: 0.2 MG/DL (ref 0.2–1)
VLDLC SERPL CALC-MCNC: 9 MG/DL (ref 5–40)
WBC # BLD AUTO: 9.3 X10E3/UL (ref 3.4–10.8)
WBC #/AREA URNS HPF: NORMAL /HPF

## 2018-04-20 DIAGNOSIS — Z3A.01 LESS THAN 8 WEEKS GESTATION OF PREGNANCY: Primary | ICD-10-CM

## 2018-04-23 ENCOUNTER — OFFICE VISIT (OUTPATIENT)
Dept: FAMILY MEDICINE CLINIC | Age: 28
End: 2018-04-23

## 2018-04-23 VITALS
WEIGHT: 158 LBS | DIASTOLIC BLOOD PRESSURE: 81 MMHG | HEIGHT: 67 IN | OXYGEN SATURATION: 99 % | RESPIRATION RATE: 18 BRPM | TEMPERATURE: 98.4 F | HEART RATE: 74 BPM | BODY MASS INDEX: 24.8 KG/M2 | SYSTOLIC BLOOD PRESSURE: 122 MMHG

## 2018-04-23 DIAGNOSIS — Z32.01 POSITIVE URINE PREGNANCY TEST: Primary | ICD-10-CM

## 2018-04-23 DIAGNOSIS — N92.6 MISSED MENSES: ICD-10-CM

## 2018-04-23 LAB
HCG URINE, QL. (POC): POSITIVE
VALID INTERNAL CONTROL?: YES

## 2018-04-23 NOTE — PATIENT INSTRUCTIONS
Learning About Pregnancy  Your Care Instructions    Your health in the early weeks of your pregnancy is particularly important for your baby's health. Take good care of yourself. Anything you do that harms your body can also harm your baby. Make sure to go to all of your doctor appointments. Regular checkups will help keep you and your baby healthy. How can you care for yourself at home? Diet  ? · Eat a balanced diet. Make sure your diet includes plenty of beans, peas, and leafy green vegetables. ? · Do not skip meals or go for many hours without eating. If you are nauseated, try to eat a small, healthy snack every 2 to 3 hours. ? · Do not eat fish that has a high level of mercury, such as shark, swordfish, or mackerel. Do not eat more than one can of tuna each week. ? · Drink plenty of fluids, enough so that your urine is light yellow or clear like water. If you have kidney, heart, or liver disease and have to limit fluids, talk with your doctor before you increase the amount of fluids you drink. ? · Cut down on caffeine, such as coffee, tea, and cola. ? · Do not drink alcohol, such as beer, wine, or hard liquor. ? · Take a multivitamin that contains at least 400 micrograms (mcg) of folic acid to help prevent birth defects. Fortified cereal and whole wheat bread are good additional sources of folic acid. ? · Increase the calcium in your diet. Try to drink a quart of skim milk each day. You may also take calcium supplements and choose foods such as cheese and yogurt. ? Lifestyle  ? · Make sure you go to your follow-up appointments. ? · Get plenty of rest. You may be unusually tired while you are pregnant. ? · Get at least 30 minutes of exercise on most days of the week. Walking is a good choice. If you have not exercised in the past, start out slowly. Take several short walks each day. ? · Do not smoke. If you need help quitting, talk to your doctor about stop-smoking programs.  These can increase your chances of quitting for good. ? · Do not touch cat feces or litter boxes. Also, wash your hands after you handle raw meat, and fully cook all meat before you eat it. Wear gloves when you work in the yard or garden, and wash your hands well when you are done. Cat feces, raw or undercooked meat, and contaminated dirt can cause an infection that may harm your baby or lead to a miscarriage. ? · Do not use saunas or hot tubs. Raising your body temperature may harm your baby. ? · Avoid chemical fumes, paint fumes, or poisons. ? · Do not use illegal drugs or alcohol. Medicines  ? · Review all of your medicines with your doctor. Some of your routine medicines may need to be changed to protect your baby. ? · Use acetaminophen (Tylenol) to relieve minor problems, such as a mild headache or backache or a mild fever with cold symptoms. Do not use nonsteroidal anti-inflammatory drugs (NSAIDs), such as ibuprofen (Advil, Motrin) or naproxen (Aleve), unless your doctor says it is okay. ? · Do not take two or more pain medicines at the same time unless the doctor told you to. Many pain medicines have acetaminophen, which is Tylenol. Too much acetaminophen (Tylenol) can be harmful. ? · Take your medicines exactly as prescribed. Call your doctor if you think you are having a problem with your medicine. ?To manage morning sickness  ? · If you feel sick when you first wake up, try eating a small snack (such as crackers) before you get out of bed. Allow some time to digest the snack, and then get out of bed slowly. ? · Do not skip meals or go for long periods without eating. An empty stomach can make nausea worse. ? · Eat small, frequent meals instead of three large meals each day. ? · Drink plenty of fluids. Sports drinks, such as Gatorade or Powerade, are good choices. ? · Eat foods that are high in protein but low in fat.    ? · If you are taking iron supplements, ask your doctor if they are necessary. Iron can make nausea worse. ? · Avoid any smells, such as coffee, that make you feel sick. ? · Get lots of rest. Morning sickness may be worse when you are tired. Follow-up care is a key part of your treatment and safety. Be sure to make and go to all appointments, and call your doctor if you are having problems. It's also a good idea to know your test results and keep a list of the medicines you take. Where can you learn more? Go to http://christin-satish.info/. Enter B155 in the search box to learn more about \"Learning About Pregnancy. \"  Current as of: March 16, 2017  Content Version: 11.4  © 6909-2911 Healthwise, Incorporated. Care instructions adapted under license by [employer] (which disclaims liability or warranty for this information). If you have questions about a medical condition or this instruction, always ask your healthcare professional. [address] any warranty or liability for your use of this information.

## 2018-04-23 NOTE — LETTER
4/23/2018 2:35 PM 
 
Ms. Leo Sánchez [facility] [address] [place] [address],[address] [address] To Whom It May Concern: 
 
Leo Sánchez is currently under the care of [address]. Ms. Neal Mom is currently pregnant. If there are questions or concerns please have the patient contact our office.  
 
 
 
Sincerely, 
 
 
Savage Tan NP

## 2018-04-23 NOTE — MR AVS SNAPSHOT
[address] [address] 
[phone] Patient: Dwain Duverney MRN:  [id number] Visit Information Date & Time Provider Department Dept. Phone Encounter #  
 2018  2:00 PM Hope Echols NP [address] [facility] [phone] [id number] Upcoming Health Maintenance Date Due Pneumococcal 19-64 Medium Risk (1 of 1 - PPSV23) 2010 HPV Age 9Y-34Y (3 of 3 - Female 3 Dose Series) 3/4/2012 PAP AKA CERVICAL CYTOLOGY 2012 Influenza Age 5 to Adult 2017 DTaP/Tdap/Td series (2 - Td) 2020 Allergies as of 2018  Review Complete On: 2018 By: oHpe Echols NP No Known Allergies Current Immunizations  Reviewed on 2016 Name Date Hepatitis A Vaccine 2011, 2011  5:03 PM  
 Human Papillomavirus 2011, 2011 MMR Vaccine 2011 Meningococcal Vaccine 2010 PPD 2011  5:01 PM  
 TDAP Vaccine 2010 Not reviewed this visit You Were Diagnosed With   
  
 Codes Comments Positive urine pregnancy test    -  Primary ICD-10-CM: Z32.01 
ICD-9-CM: V72.42 Missed menses     ICD-10-CM: N92.6 ICD-9-CM: 626.4 Vitals BP Pulse Temp Resp Height(growth percentile) Weight(growth percentile) 122/81 (BP 1 Location: Right arm, BP Patient Position: Sitting) 74 98.4 °F (36.9 °C) (Oral) 18 5' 7\" (1.702 m) 158 lb (71.7 kg) LMP SpO2 BMI OB Status Smoking Status 2018 99% 24.75 kg/m2 Having regular periods Never Smoker Vitals History BMI and BSA Data Body Mass Index Body Surface Area 24.75 kg/m 2 1.84 m 2 Preferred Pharmacy Pharmacy Name Phone [id number] [address] [phone] Your Updated Medication List  
  
   
This list is accurate as of 18  2:37 PM.  Always use your most recent med list.  
  
  
  
  
 PRENAPLUS PO  
 Take  by mouth. We Performed the Following AMB POC URINE PREGNANCY TEST, VISUAL COLOR COMPARISON [67854 CPT(R)] BETA HCG, QT [id number] CPT(R)] Patient Instructions Learning About Pregnancy Your Care Instructions Your health in the early weeks of your pregnancy is particularly important for your baby's health. Take good care of yourself. Anything you do that harms your body can also harm your baby. Make sure to go to all of your doctor appointments. Regular checkups will help keep you and your baby healthy. How can you care for yourself at home? Diet ? · Eat a balanced diet. Make sure your diet includes plenty of beans, peas, and leafy green vegetables. ? · Do not skip meals or go for many hours without eating. If you are nauseated, try to eat a small, healthy snack every 2 to 3 hours. ? · Do not eat fish that has a high level of mercury, such as shark, swordfish, or mackerel. Do not eat more than one can of tuna each week. ? · Drink plenty of fluids, enough so that your urine is light yellow or clear like water. If you have kidney, heart, or liver disease and have to limit fluids, talk with your doctor before you increase the amount of fluids you drink. ? · Cut down on caffeine, such as coffee, tea, and cola. ? · Do not drink alcohol, such as beer, wine, or hard liquor. ? · Take a multivitamin that contains at least 400 micrograms (mcg) of folic acid to help prevent birth defects. Fortified cereal and whole wheat bread are good additional sources of folic acid. ? · Increase the calcium in your diet. Try to drink a quart of skim milk each day. You may also take calcium supplements and choose foods such as cheese and yogurt. ? Lifestyle ? · Make sure you go to your follow-up appointments. ? · Get plenty of rest. You may be unusually tired while you are pregnant. ? · Get at least 30 minutes of exercise on most days of the week.  Walking is a good choice. If you have not exercised in the past, start out slowly. Take several short walks each day. ? · Do not smoke. If you need help quitting, talk to your doctor about stop-smoking programs. These can increase your chances of quitting for good. ? · Do not touch cat feces or litter boxes. Also, wash your hands after you handle raw meat, and fully cook all meat before you eat it. Wear gloves when you work in the yard or garden, and wash your hands well when you are done. Cat feces, raw or undercooked meat, and contaminated dirt can cause an infection that may harm your baby or lead to a miscarriage. ? · Do not use saunas or hot tubs. Raising your body temperature may harm your baby. ? · Avoid chemical fumes, paint fumes, or poisons. ? · Do not use illegal drugs or alcohol. Medicines ? · Review all of your medicines with your doctor. Some of your routine medicines may need to be changed to protect your baby. ? · Use acetaminophen (Tylenol) to relieve minor problems, such as a mild headache or backache or a mild fever with cold symptoms. Do not use nonsteroidal anti-inflammatory drugs (NSAIDs), such as ibuprofen (Advil, Motrin) or naproxen (Aleve), unless your doctor says it is okay. ? · Do not take two or more pain medicines at the same time unless the doctor told you to. Many pain medicines have acetaminophen, which is Tylenol. Too much acetaminophen (Tylenol) can be harmful. ? · Take your medicines exactly as prescribed. Call your doctor if you think you are having a problem with your medicine. ?To manage morning sickness ? · If you feel sick when you first wake up, try eating a small snack (such as crackers) before you get out of bed. Allow some time to digest the snack, and then get out of bed slowly. ? · Do not skip meals or go for long periods without eating. An empty stomach can make nausea worse. ? · Eat small, frequent meals instead of three large meals each day. ? · Drink plenty of fluids. Sports drinks, such as Gatorade or Powerade, are good choices. ? · Eat foods that are high in protein but low in fat. ? · If you are taking iron supplements, ask your doctor if they are necessary. Iron can make nausea worse. ? · Avoid any smells, such as coffee, that make you feel sick. ? · Get lots of rest. Morning sickness may be worse when you are tired. Follow-up care is a key part of your treatment and safety. Be sure to make and go to all appointments, and call your doctor if you are having problems. It's also a good idea to know your test results and keep a list of the medicines you take. Where can you learn more? Go to http://christin-satish.info/. Enter F831 in the search box to learn more about \"Learning About Pregnancy. \" Current as of: March 16, 2017 Content Version: 11.4 © 1837-6021 [employer]. Care instructions adapted under license by [employer] (which disclaims liability or warranty for this information). If you have questions about a medical condition or this instruction, always ask your healthcare professional. [address] any warranty or liability for your use of this information. Introducing [facility]! [employer] introduces [employer] patient portal. Now you can access parts of your medical record, email your doctor's office, and request medication refills online. 1. In your internet browser, go to [email or web address] 2. Click on the First Time User? Click Here link in the Sign In box. You will see the New Member Sign Up page. 3. Enter your [employer] Access Code exactly as it appears below. You will not need to use this code after youve completed the sign-up process. If you do not sign up before the expiration date, you must request a new code. · [employer] Access Code: [email or web address] Expires: 6/20/2018  1:33 PM 
 
 4. Enter the last four digits of your Social Security Number (xxxx) and Date of Birth (mm/dd/yyyy) as indicated and click Submit. You will be taken to the next sign-up page. 5. Create a [employer] ID. This will be your [employer] login ID and cannot be changed, so think of one that is secure and easy to remember. 6. Create a [employer] password. You can change your password at any time. 7. Enter your Password Reset Question and Answer. This can be used at a later time if you forget your password. 8. Enter your e-mail address. You will receive e-mail notification when new information is available in [address] 9. Click Sign Up. You can now view and download portions of your medical record. 10. Click the Download Summary menu link to download a portable copy of your medical information. If you have questions, please visit the Frequently Asked Questions section of the [employer] website. Remember, [employer] is NOT to be used for urgent needs. For medical emergencies, dial 911. Now available from your iPhone and Android! Please provide this summary of care documentation to your next provider. Your primary care clinician is listed as Jessica Seats. If you have any questions after today's visit, please call [phone].

## 2018-04-23 NOTE — PROGRESS NOTES
Chief Complaint   Patient presents with    Missed Menses    Letter     Patient in office today for missed menses. LMP was 3/18. Pt states she took pregnancy at home was positive. Pt states she does need a letter from work due to [employer]. 1. Have you been to the ER, urgent care clinic since your last visit? Hospitalized since your last visit? No    2. Have you seen or consulted any other health care providers outside of the [address] [place] [address] since your last visit? Include any pap smears or colon screening.  No

## 2018-04-23 NOTE — PROGRESS NOTES
Chief Complaint   Patient presents with    Missed Menses    Letter     Patient in office today for missed menses. LMP was 3/18. Pt states she took pregnancy at home was positive. This is first pregnancy minus an early miscarriage in December. ZACK 12/23. Currently taking PNV. Has noticed some increased nausea. Felt like a burning sensation. OBGYN is Dr. Jeet Kidd. Pt states she does need a letter from work due to [employer]. 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 Recently had routine labs with Dr. Victor Hugo Vaca. Denies any other concerns at this time. Chief Complaint   Patient presents with    Missed Menses    Letter     she is a 33y.o. year old female who presents for evalution. Reviewed PmHx, RxHx, FmHx, SocHx, AllgHx and updated and dated in the chart. Review of Systems - negative except as listed above in the HPI    Objective:     Vitals:    04/23/18 1403   BP: 122/81   Pulse: 74   Resp: 18   Temp: 98.4 °F (36.9 °C)   TempSrc: Oral   SpO2: 99%   Weight: 158 lb (71.7 kg)   Height: 5' 7\" (1.702 m)     Physical Examination: General appearance - alert, well appearing, and in no distress  Mental status - normal mood, behavior  Chest - clear to auscultation, no wheezes, rales or rhonchi, symmetric air entry  Heart - normal rate, regular rhythm, normal S1, S2, no murmurs    Assessment/ Plan:   Diagnoses and all orders for this visit:    1. Positive urine pregnancy test  -     BETA HCG, QT  Will notify results and deviate plan based on findings. Enc to continue PNV daily. Est care with Dr. Jeet Kidd for ongoing management. 2. Missed menses  -     AMB POC URINE PREGNANCY TEST, VISUAL COLOR COMPARISON  Positive UPT. Follow-up Disposition:  Return if symptoms worsen or fail to improve. I have discussed the diagnosis with the patient and the intended plan as seen in the above orders.   The patient has received an after-visit summary and questions were answered concerning future plans. Medication Side Effects and Warnings were discussed with patient: yes  Patient Labs were reviewed and or requested: yes  Patient Past Records were reviewed and or requested  yes  Patient / Caregiver Understanding of treatment plan was verbalized during office visit YES    GODFREY Arrieta    Patient Instructions          Learning About Pregnancy  Your Care Instructions    Your health in the early weeks of your pregnancy is particularly important for your baby's health. Take good care of yourself. Anything you do that harms your body can also harm your baby. Make sure to go to all of your doctor appointments. Regular checkups will help keep you and your baby healthy. How can you care for yourself at home? Diet  ? · Eat a balanced diet. Make sure your diet includes plenty of beans, peas, and leafy green vegetables. ? · Do not skip meals or go for many hours without eating. If you are nauseated, try to eat a small, healthy snack every 2 to 3 hours. ? · Do not eat fish that has a high level of mercury, such as shark, swordfish, or mackerel. Do not eat more than one can of tuna each week. ? · Drink plenty of fluids, enough so that your urine is light yellow or clear like water. If you have kidney, heart, or liver disease and have to limit fluids, talk with your doctor before you increase the amount of fluids you drink. ? · Cut down on caffeine, such as coffee, tea, and cola. ? · Do not drink alcohol, such as beer, wine, or hard liquor. ? · Take a multivitamin that contains at least 400 micrograms (mcg) of folic acid to help prevent birth defects. Fortified cereal and whole wheat bread are good additional sources of folic acid. ? · Increase the calcium in your diet. Try to drink a quart of skim milk each day. You may also take calcium supplements and choose foods such as cheese and yogurt. ? Lifestyle  ?  · Make sure you go to your follow-up appointments. ? · Get plenty of rest. You may be unusually tired while you are pregnant. ? · Get at least 30 minutes of exercise on most days of the week. Walking is a good choice. If you have not exercised in the past, start out slowly. Take several short walks each day. ? · Do not smoke. If you need help quitting, talk to your doctor about stop-smoking programs. These can increase your chances of quitting for good. ? · Do not touch cat feces or litter boxes. Also, wash your hands after you handle raw meat, and fully cook all meat before you eat it. Wear gloves when you work in the yard or garden, and wash your hands well when you are done. Cat feces, raw or undercooked meat, and contaminated dirt can cause an infection that may harm your baby or lead to a miscarriage. ? · Do not use saunas or hot tubs. Raising your body temperature may harm your baby. ? · Avoid chemical fumes, paint fumes, or poisons. ? · Do not use illegal drugs or alcohol. Medicines  ? · Review all of your medicines with your doctor. Some of your routine medicines may need to be changed to protect your baby. ? · Use acetaminophen (Tylenol) to relieve minor problems, such as a mild headache or backache or a mild fever with cold symptoms. Do not use nonsteroidal anti-inflammatory drugs (NSAIDs), such as ibuprofen (Advil, Motrin) or naproxen (Aleve), unless your doctor says it is okay. ? · Do not take two or more pain medicines at the same time unless the doctor told you to. Many pain medicines have acetaminophen, which is Tylenol. Too much acetaminophen (Tylenol) can be harmful. ? · Take your medicines exactly as prescribed. Call your doctor if you think you are having a problem with your medicine. ?To manage morning sickness  ? · If you feel sick when you first wake up, try eating a small snack (such as crackers) before you get out of bed. Allow some time to digest the snack, and then get out of bed slowly.    ? · Do not skip meals or go for long periods without eating. An empty stomach can make nausea worse. ? · Eat small, frequent meals instead of three large meals each day. ? · Drink plenty of fluids. Sports drinks, such as Gatorade or Powerade, are good choices. ? · Eat foods that are high in protein but low in fat. ? · If you are taking iron supplements, ask your doctor if they are necessary. Iron can make nausea worse. ? · Avoid any smells, such as coffee, that make you feel sick. ? · Get lots of rest. Morning sickness may be worse when you are tired. Follow-up care is a key part of your treatment and safety. Be sure to make and go to all appointments, and call your doctor if you are having problems. It's also a good idea to know your test results and keep a list of the medicines you take. Where can you learn more? Go to http://christin-satish.info/. Enter I643 in the search box to learn more about \"Learning About Pregnancy. \"  Current as of: March 16, 2017  Content Version: 11.4  © 4966-8007 Healthwise, Incorporated. Care instructions adapted under license by [employer] (which disclaims liability or warranty for this information). If you have questions about a medical condition or this instruction, always ask your healthcare professional. [address] any warranty or liability for your use of this information.

## 2018-04-24 LAB — HCG INTACT+B SERPL-ACNC: NORMAL MIU/ML

## 2018-04-24 NOTE — PROGRESS NOTES
Please notify pt that she is approx 6 or 7 weeks pregnant. Will get a more exact date when she has her initial US with OBGYN. Continue with plan discussed during OV and follow up with OBGYN for ongoing management.

## 2018-04-26 ENCOUNTER — TELEPHONE (OUTPATIENT)
Dept: FAMILY MEDICINE CLINIC | Age: 28
End: 2018-04-26

## 2018-04-26 NOTE — TELEPHONE ENCOUNTER
----- Message from Bianca Fischer sent at 4/26/2018  7:36 AM EDT -----  Regarding: NP /telephone  Pt would like to speak to the doctor, about what to take for a cold, pt  is 6 to 7 weeks OB, please call pt at [phone].  Please call pt today,

## 2019-01-29 ENCOUNTER — OFFICE VISIT (OUTPATIENT)
Dept: FAMILY MEDICINE CLINIC | Age: 29
End: 2019-01-29

## 2019-01-29 DIAGNOSIS — Z23 ENCOUNTER FOR IMMUNIZATION: ICD-10-CM

## 2019-01-29 DIAGNOSIS — Z11.1 SCREENING EXAMINATION FOR PULMONARY TUBERCULOSIS: ICD-10-CM

## 2019-01-29 NOTE — PATIENT INSTRUCTIONS
Tuberculin Skin Test: Care Instructions  Your Care Instructions    Tuberculosis (TB) is a bacterial infection that can damage the lungs or other parts of the body. The TB skin test can tell if you have TB bacteria in your body. Many people are exposed to TB and test positive for TB bacteria in their bodies, but they don't get the disease. TB bacteria can stay in your body without making you sick. This is because your immune system can keep TB in check. Your doctor may want you to have a TB skin test if you have been in close contact with someone who has TB. Or you may need the test if you have symptoms that might be caused by TB, such as a cough that does not go away, a fever, or weight loss. You also may get the test if you are a health care worker. During the skin test, part of a TB bacterium is injected under your skin. The test will feel like a skin prick. If you have TB bacteria in your body, a firm red bump will form at the shot site within 2 days. If the test shows that you are infected with TB (positive), your doctor probably will order more tests. A TB-positive skin test can't tell when you became infected with TB. And it can't tell whether the infection can be passed to others. Follow-up care is a key part of your treatment and safety. Be sure to make and go to all appointments, and call your doctor if you are having problems. It's also a good idea to know your test results and keep a list of the medicines you take. How can you care for yourself at home? · Do not scratch the test site. Scratching it may cause redness or swelling. This could affect the test results. · To ease itching, put a cold washcloth on the site. Then pat the site dry. · Do not cover the test site with a bandage or other dressing. · Go back to your doctor's office or hospital to have the test read on the follow-up date. This must be done between 48 and 72 hours after you get the shot. When should you call for help?   Watch closely for changes in your health, and be sure to contact your doctor if you have any problems. Where can you learn more? Go to http://christin-satish.info/. Enter [phone] in the search box to learn more about \"Tuberculin Skin Test: Care Instructions. \"  Current as of: July 30, 2018  Content Version: 11.9  © 3414-4133 [employer]. Care instructions adapted under license by [employer] (which disclaims liability or warranty for this information). If you have questions about a medical condition or this instruction, always ask your healthcare professional. [place] [address] any warranty or liability for your use of this information.

## 2019-01-29 NOTE — PROGRESS NOTES
Chief Complaint   Patient presents with    PPD Placement     Patient presents in office today for PPD placement only. PPD placed in left arm. Tolerated well.   Instructed to return after 10:30 Thursday or before 10:30 Friday./

## 2019-02-01 LAB
MM INDURATION POC: 0 MM (ref 0–5)
PPD POC: NEGATIVE NEGATIVE

## 2019-03-29 ENCOUNTER — OFFICE VISIT (OUTPATIENT)
Dept: FAMILY MEDICINE CLINIC | Age: 29
End: 2019-03-29

## 2019-03-29 VITALS
BODY MASS INDEX: 25.43 KG/M2 | RESPIRATION RATE: 16 BRPM | SYSTOLIC BLOOD PRESSURE: 122 MMHG | DIASTOLIC BLOOD PRESSURE: 62 MMHG | TEMPERATURE: 98.8 F | HEIGHT: 67 IN | WEIGHT: 162 LBS | HEART RATE: 77 BPM | OXYGEN SATURATION: 97 %

## 2019-03-29 DIAGNOSIS — M79.671 RIGHT FOOT PAIN: Primary | ICD-10-CM

## 2019-03-29 NOTE — PROGRESS NOTES
Jhonathan Diaz is a 33 y.o. female   Chief Complaint   Patient presents with    Complete Physical    Labs    pt here for CPE with straight medicaid and advised medicaid will not cover CPE. Pt is planning to get insurance thru work hopefully soon. Pt states she is having R foot pain at her 1st meta tarsal.  States worse when walking or running. 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 Not using anything for it.      she is a 33y.o. year old female who presents for evalution. Reviewed PmHx, RxHx, FmHx, SocHx, AllgHx and updated and dated in the chart. Review of Systems - negative except as listed above in the HPI    Objective:     Vitals:    03/29/19 1118   BP: 122/62   Pulse: 77   Resp: 16   Temp: 98.8 °F (37.1 °C)   TempSrc: Oral   SpO2: 97%   Weight: 162 lb (73.5 kg)   Height: 5' 7\" (1.702 m)       Current Outpatient Medications   Medication Sig    prenatal vit,naomi 74/iron/folic (PRENAPLUS PO) Take  by mouth. No current facility-administered medications for this visit. Physical Examination: General appearance - alert, well appearing, and in no distress  Chest - clear to auscultation, no wheezes, rales or rhonchi, symmetric air entry  Heart - normal rate, regular rhythm, normal S1, S2, no murmurs, rubs, clicks or gallops  Musculoskeletal - no foot ttp      Assessment/ Plan:   Diagnoses and all orders for this visit:    1. Right foot pain     motrin prn  Follow-up and Dispositions    · Return if symptoms worsen or fail to improve. I have discussed the diagnosis with the patient and the intended plan as seen in the above orders. The patient has received an after-visit summary and questions were answered concerning future plans. Pt conveyed understanding of plan. Medication Side Effects and Warnings were discussed with patient      Alireza Russ DO     Discussed the patient's BMI with her.   The BMI follow up plan is as follows:     dietary management education, guidance, and counseling  encourage exercise  monitor weight  prescribed dietary intake    An After Visit Summary was printed and given to the patient.

## 2019-03-29 NOTE — PATIENT INSTRUCTIONS
A Healthy Lifestyle: Care Instructions Your Care Instructions A healthy lifestyle can help you feel good, stay at a healthy weight, and have plenty of energy for both work and play. A healthy lifestyle is something you can share with your whole family. A healthy lifestyle also can lower your risk for serious health problems, such as high blood pressure, heart disease, and diabetes. You can follow a few steps listed below to improve your health and the health of your family. Follow-up care is a key part of your treatment and safety. Be sure to make and go to all appointments, and call your doctor if you are having problems. It's also a good idea to know your test results and keep a list of the medicines you take. How can you care for yourself at home? · Do not eat too much sugar, fat, or fast foods. You can still have dessert and treats now and then. The goal is moderation. · Start small to improve your eating habits. Pay attention to portion sizes, drink less juice and soda pop, and eat more fruits and vegetables. ? Eat a healthy amount of food. A 3-ounce serving of meat, for example, is about the size of a deck of cards. Fill the rest of your plate with vegetables and whole grains. ? Limit the amount of soda and sports drinks you have every day. Drink more water when you are thirsty. ? Eat at least 5 servings of fruits and vegetables every day. It may seem like a lot, but it is not hard to reach this goal. A serving or helping is 1 piece of fruit, 1 cup of vegetables, or 2 cups of leafy, raw vegetables. Have an apple or some carrot sticks as an afternoon snack instead of a candy bar. Try to have fruits and/or vegetables at every meal. 
· Make exercise part of your daily routine. You may want to start with simple activities, such as walking, bicycling, or slow swimming. Try to be active 30 to 60 minutes every day.  You do not need to do all 30 to 60 minutes all at once. For example, you can exercise 3 times a day for 10 or 20 minutes. Moderate exercise is safe for most people, but it is always a good idea to talk to your doctor before starting an exercise program. 
· Keep moving. Caleen Newer the lawn, work in the garden, or [employer]. Take the stairs instead of the elevator at work. · If you smoke, quit. People who smoke have an increased risk for heart attack, stroke, cancer, and other lung illnesses. Quitting is hard, but there are ways to boost your chance of quitting tobacco for good. ? Use nicotine gum, patches, or lozenges. ? Ask your doctor about stop-smoking programs and medicines. ? Keep trying. In addition to reducing your risk of diseases in the future, you will notice some benefits soon after you stop using tobacco. If you have shortness of breath or asthma symptoms, they will likely get better within a few weeks after you quit. · Limit how much alcohol you drink. Moderate amounts of alcohol (up to 2 drinks a day for men, 1 drink a day for women) are okay. But drinking too much can lead to liver problems, high blood pressure, and other health problems. Family health If you have a family, there are many things you can do together to improve your health. · Eat meals together as a family as often as possible. · Eat healthy foods. This includes fruits, vegetables, lean meats and dairy, and whole grains. · Include your family in your fitness plan. Most people think of activities such as jogging or tennis as the way to fitness, but there are many ways you and your family can be more active. Anything that makes you breathe hard and gets your heart pumping is exercise. Here are some tips: 
? Walk to do errands or to take your child to school or the bus. 
? Go for a family bike ride after dinner instead of watching TV. Where can you learn more? Go to http://christin-satish.info/. Enter G125 in the search box to learn more about \"A Healthy Lifestyle: Care Instructions. \" Current as of: September 11, 2018 Content Version: 11.9 © 1354-9546 [employer]. Care instructions adapted under license by [employer] (which disclaims liability or warranty for this information). If you have questions about a medical condition or this instruction, always ask your healthcare professional. [address] any warranty or liability for your use of this information. Body Mass Index: Care Instructions Your Care Instructions Body mass index (BMI) can help you see if your weight is raising your risk for health problems. It uses a formula to compare how much you weigh with how tall you are. · A BMI lower than 18.5 is considered underweight. · A BMI between 18.5 and 24.9 is considered healthy. · A BMI between 25 and 29.9 is considered overweight. A BMI of 30 or higher is considered obese. If your BMI is in the normal range, it means that you have a lower risk for weight-related health problems. If your BMI is in the overweight or obese range, you may be at increased risk for weight-related health problems, such as high blood pressure, heart disease, stroke, arthritis or joint pain, and diabetes. If your BMI is in the underweight range, you may be at increased risk for health problems such as fatigue, lower protection (immunity) against illness, muscle loss, bone loss, hair loss, and hormone problems. BMI is just one measure of your risk for weight-related health problems. You may be at higher risk for health problems if you are not active, you eat an unhealthy diet, or you drink too much alcohol or use tobacco products. Follow-up care is a key part of your treatment and safety. Be sure to make and go to all appointments, and call your doctor if you are having problems.  It's also a good idea to know your test results and keep a list of the medicines you take. How can you care for yourself at home? · Practice healthy eating habits. This includes eating plenty of fruits, vegetables, whole grains, lean protein, and low-fat dairy. · If your doctor recommends it, get more exercise. Walking is a good choice. Bit by bit, increase the amount you walk every day. Try for at least 30 minutes on most days of the week. · Do not smoke. Smoking can increase your risk for health problems. If you need help quitting, talk to your doctor about stop-smoking programs and medicines. These can increase your chances of quitting for good. · Limit alcohol to 2 drinks a day for men and 1 drink a day for women. Too much alcohol can cause health problems. If you have a BMI higher than 25 · Your doctor may do other tests to check your risk for weight-related health problems. This may include measuring the distance around your waist. A waist measurement of more than 40 inches in men or 35 inches in women can increase the risk of weight-related health problems. · Talk with your doctor about steps you can take to stay healthy or improve your health. You may need to make lifestyle changes to lose weight and stay healthy, such as changing your diet and getting regular exercise. If you have a BMI lower than 18.5 · Your doctor may do other tests to check your risk for health problems. · Talk with your doctor about steps you can take to stay healthy or improve your health. You may need to make lifestyle changes to gain or maintain weight and stay healthy, such as getting more healthy foods in your diet and doing exercises to build muscle. Where can you learn more? Go to http://christin-satish.info/. Enter S176 in the search box to learn more about \"Body Mass Index: Care Instructions. \" Current as of: October 13, 2016 Content Version: 11.4 © 1702-2144 Healthwise, Incorporated.  Care instructions adapted under license by [address] (which disclaims liability or warranty for this information). If you have questions about a medical condition or this instruction, always ask your healthcare professional. [address] any warranty or liability for your use of this information.

## 2019-03-29 NOTE — PROGRESS NOTES
Chief Complaint   Patient presents with    Complete Physical    Labs     Patient presents in office today for CPE and fasting labs. No concerns. 1. Have you been to the ER, urgent care clinic since your last visit? Hospitalized since your last visit? No    2. Have you seen or consulted any other health care providers outside of the [address] [place] [address] since your last visit? Include any pap smears or colon screening.  No    Learning Assessment 3/22/2018   PRIMARY LEARNER Patient   HIGHEST LEVEL OF EDUCATION - PRIMARY LEARNER  GRADUATED HIGH SCHOOL OR GED   BARRIERS PRIMARY LEARNER NONE   CO-LEARNER CAREGIVER No   PRIMARY LANGUAGE ENGLISH   LEARNER PREFERENCE PRIMARY DEMONSTRATION   ANSWERED BY pt   RELATIONSHIP SELF

## 2020-12-14 LAB — SARS-COV-2, NAA: NEGATIVE

## 2022-01-24 ENCOUNTER — DOCUMENTATION ONLY (OUTPATIENT)
Dept: FAMILY MEDICINE CLINIC | Age: 32
End: 2022-01-24